# Patient Record
Sex: FEMALE | Employment: PART TIME | URBAN - METROPOLITAN AREA
[De-identification: names, ages, dates, MRNs, and addresses within clinical notes are randomized per-mention and may not be internally consistent; named-entity substitution may affect disease eponyms.]

---

## 2019-02-14 ENCOUNTER — INITIAL CONSULT (OUTPATIENT)
Dept: SURGERY | Age: 39
End: 2019-02-14

## 2019-02-14 VITALS
SYSTOLIC BLOOD PRESSURE: 108 MMHG | HEART RATE: 79 BPM | WEIGHT: 125 LBS | OXYGEN SATURATION: 98 % | HEIGHT: 66 IN | BODY MASS INDEX: 20.09 KG/M2 | DIASTOLIC BLOOD PRESSURE: 68 MMHG

## 2019-02-14 DIAGNOSIS — R23.8 FACIAL AGING: Primary | ICD-10-CM

## 2019-02-14 PROCEDURE — DM00130 PR DERM ONLY BOTOX INJ LEVEL 4: Performed by: PLASTIC SURGERY

## 2019-02-14 PROCEDURE — MISCPX MISCPX: Performed by: PLASTIC SURGERY

## 2019-02-28 ENCOUNTER — OFFICE VISIT (OUTPATIENT)
Dept: SURGERY | Age: 39
End: 2019-02-28

## 2019-02-28 VITALS — BODY MASS INDEX: 20.09 KG/M2 | HEIGHT: 66 IN | WEIGHT: 125 LBS

## 2019-02-28 DIAGNOSIS — R23.8 FACIAL AGING: Primary | ICD-10-CM

## 2019-02-28 PROCEDURE — DM00130 PR DERM ONLY BOTOX INJ LEVEL 4: Performed by: PLASTIC SURGERY

## 2019-03-28 ENCOUNTER — OFFICE VISIT (OUTPATIENT)
Dept: SURGERY | Age: 39
End: 2019-03-28

## 2019-03-28 VITALS
DIASTOLIC BLOOD PRESSURE: 68 MMHG | HEIGHT: 65 IN | RESPIRATION RATE: 20 BRPM | BODY MASS INDEX: 20.83 KG/M2 | WEIGHT: 125 LBS | SYSTOLIC BLOOD PRESSURE: 108 MMHG | TEMPERATURE: 96 F | HEART RATE: 64 BPM

## 2019-03-28 DIAGNOSIS — R23.8 FACIAL AGING: Primary | ICD-10-CM

## 2019-03-28 PROCEDURE — MISCPNC PLASTICS VISIT NO CHARGE: Performed by: PLASTIC SURGERY

## 2019-03-28 RX ORDER — CHLORAL HYDRATE 500 MG
CAPSULE ORAL DAILY
COMMUNITY

## 2019-03-28 RX ORDER — CALCIUM CARBONATE 500(1250)
500 TABLET ORAL DAILY
COMMUNITY

## 2019-03-28 RX ORDER — NICOTINE POLACRILEX 2 MG
GUM BUCCAL
COMMUNITY

## 2019-03-28 NOTE — PROGRESS NOTES
Subjective: Follow up today from  Botox injections to her forehead, and crows feet. Pt responded very well. She is here today for repeat injections. . Denies fever, nausea, vomiting, leg pain or swelling, pain is absent. Objective:    /68   Pulse 64   Temp 96 °F (35.6 °C) (Tympanic)   Resp 20   Ht 5' 5\" (1.651 m)   Wt 125 lb (56.7 kg)   LMP 03/18/2019 (Exact Date)   Breastfeeding? Yes   BMI 20.80 kg/m²     O: Patient has dynamic glabellar frown lines, forehead wrinkles and crows feet       Assessment:    There is no problem list on file for this patient. Plan:     Botox injections to forehead and lateral crows feet. Patient exhibited good response follow up in 2 months. The risks, benefits and options were discussed with the pt. The risks included but not limited to pain, bleeding, infection, heavy scarring, damage to surrounding structures, fluid collections, asymmetry, and need for further procedures. All of Her questions were answered to her satisfaction and She agrees to proceed with the operation. F/U in 2 months. Call office with concerns or signs of infection. I attest that the patient was seen and examined by me, and concur with the documentation above. I agree with the assessment and the plan outlined. This document is generated, in part, by voice recognition software and thus  syntax and grammatical errors are possible.     Ashley Gilmore  12:53 PM  4/4/2019

## 2019-05-08 ENCOUNTER — OFFICE VISIT (OUTPATIENT)
Dept: SURGERY | Age: 39
End: 2019-05-08

## 2019-05-08 VITALS
SYSTOLIC BLOOD PRESSURE: 102 MMHG | HEART RATE: 64 BPM | BODY MASS INDEX: 20.09 KG/M2 | HEIGHT: 66 IN | DIASTOLIC BLOOD PRESSURE: 70 MMHG | WEIGHT: 125 LBS | TEMPERATURE: 98.9 F | OXYGEN SATURATION: 99 %

## 2019-05-08 DIAGNOSIS — R23.8 FACIAL AGING: Primary | ICD-10-CM

## 2019-05-08 PROCEDURE — DM00275 XEOMIN 12: Performed by: PLASTIC SURGERY

## 2019-05-08 NOTE — PROGRESS NOTES
Botulinum Toxin Injection Procedure Note:      The risks, benefits and options were discussed with the pt. The risks included but not limited to pain, bleeding, infection, asymmetry,  and need for further procedures. The patient understands that there is a risk for upper eyelid ptosis. There may be a need for touch up injections and follow up at 2 weeks is encouraged. All of Her questions were answered to Her satisfaction and She agrees to proceed with the operation. The glabella  was cleansed with alcohol. Ice was used to numb the area. The different FDA approved brands of botulinum toxin A were discussed with the patient, Botox was agreed upon and reconstituted in a concentration of 1 unit/ 0.01cc with sterile injectable saline. 30 units were injected into the areas. There was pinpoint bleeding and local redness. The patient tolerated the procedure well. The patient was counseled to use ice for the next hour and limit strenuous activity for 24 hours. Follow up in 2 weeks for check or 3 months for re-injection. I attest that the patient was seen and examined by me, and concur with the documentation above. I agree with the assessment and the plan outlined. This document is generated, in part, by voice recognition software and thus  syntax and grammatical errors are possible.     Talisha Paez  2:10 PM  5/8/2019

## 2019-05-09 NOTE — PROGRESS NOTES
Xeomin lot # [id number] exp 01/21                     Exp: 1sept 2020  FEK:[id number]  Bacteriostatic 0.9% Sodium Chloride lot#  [id number]

## 2019-07-29 NOTE — PROGRESS NOTES
Botulinum Toxin Injection Procedure Note:    The patient reports that she had a fairly optimal result with the Xeomin increased to 30 in the glabella. She has done some reading online and questions about the differences between Xeomin and Botox. I have informed her that molecularly the active molecule is the same however Xeomin has absent surrounding proteins. Also informed her that some patients anecdotally reports that one works better for them than the other. She states she is willing to try Botox today as she would like to compare the 2. The risks, benefits and options were discussed with the pt. The risks included but not limited to pain, bleeding, infection, asymmetry,  and need for further procedures. The patient understands that there is a risk for upper eyelid ptosis. There may be a need for touch up injections and follow up at 2 weeks is encouraged. All of Her questions were answered to Her satisfaction and She agrees to proceed with the operation. The glabella, forehead, crows feet  was cleansed with alcohol. Ice was used to numb the area. The different FDA approved brands of botulinum toxin A were discussed with the patient, Botox was agreed upon and reconstituted in a concentration of 1 unit/ 0.01cc with sterile injectable saline. 56 units were injected into the areas. There was pinpoint bleeding and local redness. The patient tolerated the procedure well. The patient was counseled to use ice for the next hour and limit strenuous activity for 24 hours. Follow up in 2 weeks for check or 3 months for re-injection. I attest that the patient was seen and examined by me, and concur with the documentation above. I agree with the assessment and the plan outlined. This document is generated, in part, by voice recognition software and thus  syntax and grammatical errors are possible.     Lilia Hirsch  9:43 AM  8/2/2019

## 2019-08-01 ENCOUNTER — OFFICE VISIT (OUTPATIENT)
Dept: SURGERY | Age: 39
End: 2019-08-01

## 2019-08-01 VITALS
TEMPERATURE: 97.8 F | HEIGHT: 66 IN | WEIGHT: 125 LBS | DIASTOLIC BLOOD PRESSURE: 60 MMHG | OXYGEN SATURATION: 100 % | BODY MASS INDEX: 20.09 KG/M2 | SYSTOLIC BLOOD PRESSURE: 110 MMHG | HEART RATE: 67 BPM

## 2019-08-01 DIAGNOSIS — R23.8 FACIAL AGING: Primary | ICD-10-CM

## 2019-08-01 PROCEDURE — DM00150 PR OFFICE/OUTPT VISIT,PROCEDURE ONLY: Performed by: PLASTIC SURGERY

## 2019-10-02 NOTE — PROGRESS NOTES
Botox [facility]#[id number] exp 11/2021    Bacteriostatic 0.9% Sodium Chloride  Lot#[id number]            Exp:[id number]  [facility]:[id number]

## 2019-10-10 ENCOUNTER — OFFICE VISIT (OUTPATIENT)
Dept: SURGERY | Age: 39
End: 2019-10-10

## 2019-10-10 DIAGNOSIS — R23.8 FACIAL AGING: Primary | ICD-10-CM

## 2019-10-10 PROCEDURE — DM00150 PR DERM ONLY BOTOX INJ LEVEL 6: Performed by: PLASTIC SURGERY

## 2019-12-05 ENCOUNTER — OFFICE VISIT (OUTPATIENT)
Dept: SURGERY | Age: 39
End: 2019-12-05

## 2019-12-05 VITALS
OXYGEN SATURATION: 98 % | WEIGHT: 127 LBS | BODY MASS INDEX: 20.41 KG/M2 | HEART RATE: 67 BPM | TEMPERATURE: 97 F | HEIGHT: 66 IN | SYSTOLIC BLOOD PRESSURE: 100 MMHG | DIASTOLIC BLOOD PRESSURE: 70 MMHG | RESPIRATION RATE: 16 BRPM

## 2019-12-05 DIAGNOSIS — R23.8 FACIAL AGING: Primary | ICD-10-CM

## 2019-12-05 PROCEDURE — DM00275 XEOMIN 12: Performed by: PLASTIC SURGERY

## 2019-12-05 RX ORDER — UREA 10 %
100 LOTION (ML) TOPICAL DAILY
COMMUNITY

## 2020-02-27 ENCOUNTER — OFFICE VISIT (OUTPATIENT)
Dept: SURGERY | Age: 40
End: 2020-02-27

## 2020-02-27 VITALS
SYSTOLIC BLOOD PRESSURE: 118 MMHG | BODY MASS INDEX: 20.09 KG/M2 | DIASTOLIC BLOOD PRESSURE: 60 MMHG | HEIGHT: 66 IN | OXYGEN SATURATION: 99 % | HEART RATE: 72 BPM | TEMPERATURE: 98.3 F | WEIGHT: 125 LBS

## 2020-02-27 PROCEDURE — DM00275 XEOMIN 12: Performed by: PLASTIC SURGERY

## 2020-02-27 NOTE — PROGRESS NOTES
Xeomin lot # [id number] exp 2022/01    Bacteriostatic 0.9% Sodium Chloride   lot# [id number]   Exp:[id number]   AXJ:[id number]

## 2020-03-02 ENCOUNTER — TELEPHONE (OUTPATIENT)
Dept: SURGERY | Age: 40
End: 2020-03-02

## 2020-03-05 NOTE — PROGRESS NOTES
Botulinum Toxin Injection Procedure Note:    The patient felt that Xeomin tends to work better than Botox and would like to continue with Xeomin. She like to get on schedule with all areas of the face injected today. The risks, benefits and options were discussed with the pt. The risks included but not limited to pain, bleeding, infection, asymmetry,  and need for further procedures. The patient understands that there is a risk for upper eyelid ptosis. There may be a need for touch up injections and follow up at 2 weeks is encouraged. All of Her questions were answered to Her satisfaction and She agrees to proceed with the operation. The glabella, forehead, crows feet  was cleansed with alcohol. Ice was used to numb the area. The different FDA approved brands of botulinum toxin A were discussed with the patient, Xeomin was agreed upon and reconstituted in a concentration of 1 unit/ 0.01cc with sterile injectable saline. 66 units were injected into the areas. There was pinpoint bleeding and local redness. The patient tolerated the procedure well. The patient was counseled to use ice for the next hour and limit strenuous activity for 24 hours. Follow up in 2 weeks for check or 3 months for re-injection. I attest that the patient was seen and examined by me, and concur with the documentation above. I agree with the assessment and the plan outlined. This document is generated, in part, by voice recognition software and thus  syntax and grammatical errors are possible.     [facility]  8:17 AM  3/5/2020

## 2020-05-13 ENCOUNTER — OFFICE VISIT (OUTPATIENT)
Dept: SURGERY | Age: 40
End: 2020-05-13

## 2020-05-13 VITALS — TEMPERATURE: 99.4 F

## 2020-05-13 PROCEDURE — DM00275 XEOMIN 12: Performed by: PLASTIC SURGERY

## 2020-05-18 NOTE — PROGRESS NOTES
Xeomin TDI#[id number] exp 2022/02    Bacteriostatic 0.9% Sodium Chloride   lot#[id number]             Expiration: 01oct2020                 QAW:[id number]

## 2020-07-22 ENCOUNTER — OFFICE VISIT (OUTPATIENT)
Dept: SURGERY | Age: 40
End: 2020-07-22

## 2020-07-22 VITALS
SYSTOLIC BLOOD PRESSURE: 110 MMHG | OXYGEN SATURATION: 96 % | TEMPERATURE: 99 F | WEIGHT: 125 LBS | DIASTOLIC BLOOD PRESSURE: 62 MMHG | HEIGHT: 60 IN | HEART RATE: 70 BPM | BODY MASS INDEX: 24.54 KG/M2

## 2020-07-22 PROCEDURE — DM00275 XEOMIN 12: Performed by: PLASTIC SURGERY

## 2020-07-22 NOTE — PROGRESS NOTES
Xeomin 66 units  NDC 26267-134-24  LOT [id number]  EXP 02/2022    Bacteriostatic 0.9% Sodium Chloride  [id number]  Exp 10/01/20  [facility] [phone]

## 2020-07-22 NOTE — PROGRESS NOTES
Botulinum Toxin Injection Procedure Note:    The patient felt that Xeomin tends to work better than Botox and would like to continue with Xeomin. The risks, benefits and options were discussed with the pt. The risks included but not limited to pain, bleeding, infection, asymmetry,  and need for further procedures. The patient understands that there is a risk for upper eyelid ptosis. There may be a need for touch up injections and follow up at 2 weeks is encouraged. All of Her questions were answered to Her satisfaction and She agrees to proceed with the operation. The glabella, forehead, crows feet  was cleansed with alcohol. Ice was used to numb the area. The different FDA approved brands of botulinum toxin A were discussed with the patient, Xeomin was agreed upon and reconstituted in a concentration of 1 unit/ 0.01cc with sterile injectable saline. 66 units were injected into the areas. There was pinpoint bleeding and local redness. The patient tolerated the procedure well. The patient was counseled to use ice for the next hour and limit strenuous activity for 24 hours. I would like to see her in 5 weeks time to see the optimal effect of her botulinum toxin injection. At that point time we will decide how we will proceed with 3 injections Weather it be the same dose moving forward and understand that she is a fast metabolizer or we may plan on additional injections to the glabella area. This document is generated, in part, by voice recognition software and thus  syntax and grammatical errors are possible.     Vincent Rosales Gooden  11:02 AM  7/22/2020

## 2020-10-22 ENCOUNTER — OFFICE VISIT (OUTPATIENT)
Dept: SURGERY | Age: 40
End: 2020-10-22

## 2020-10-22 VITALS — HEART RATE: 98 BPM | TEMPERATURE: 98.7 F | OXYGEN SATURATION: 98 %

## 2020-10-22 PROCEDURE — DM00275 XEOMIN 12: Performed by: PLASTIC SURGERY

## 2020-10-22 NOTE — PROGRESS NOTES
Botulinum Toxin Injection Procedure Note:    The patient  would like to continue with Xeomin. The risks, benefits and options were discussed with the pt. The risks included but not limited to pain, bleeding, infection, asymmetry,  and need for further procedures. The patient understands that there is a risk for upper eyelid ptosis. There may be a need for touch up injections and follow up at 2 weeks is encouraged. All of Her questions were answered to Her satisfaction and She agrees to proceed with the operation. The glabella, forehead, crows feet  was cleansed with alcohol. Ice was used to numb the area. The different FDA approved brands of botulinum toxin A were discussed with the patient, Xeomin was agreed upon and reconstituted in a concentration of 1 unit/ 0.01cc with sterile injectable saline. 66 units were injected into the areas. There was pinpoint bleeding and local redness. The patient tolerated the procedure well. The patient was counseled to use ice for the next hour and limit strenuous activity for 24 hours. This document is generated, in part, by voice recognition software and thus  syntax and grammatical errors are possible.     Brie Givens  10:33 AM  10/22/2020

## 2020-10-29 NOTE — PROGRESS NOTES
Xeomin lot # [id number] exp 2022/07   Xeomin lot# [id number] exp 2022/02    Bacteriostatic 0.9% Sodium Chloride   lot#   [id number]          Expiration:[id number]                  D:[id number]

## 2021-01-08 ENCOUNTER — OFFICE VISIT (OUTPATIENT)
Dept: SURGERY | Age: 41
End: 2021-01-08

## 2021-01-08 VITALS
OXYGEN SATURATION: 97 % | HEIGHT: 66 IN | BODY MASS INDEX: 20.09 KG/M2 | DIASTOLIC BLOOD PRESSURE: 66 MMHG | SYSTOLIC BLOOD PRESSURE: 102 MMHG | HEART RATE: 92 BPM | TEMPERATURE: 98 F | WEIGHT: 125 LBS

## 2021-01-08 DIAGNOSIS — R23.8 FACIAL AGING: Primary | ICD-10-CM

## 2021-01-08 PROCEDURE — DM00275 PR OFFICE/OUTPT VISIT,PROCEDURE ONLY: Performed by: PLASTIC SURGERY

## 2021-01-08 NOTE — PROGRESS NOTES
Xeomin 66 units  NDC [id number]  LOT # [id number]  EXP 2022-09    Bacteriostatic 0.9% sodium chloride  Lot [id number]  EXP 01 JUL 2021  [facility] [phone]

## 2021-01-08 NOTE — PROGRESS NOTES
Botulinum Toxin Injection Procedure Note:    The patient  would like to continue with Xeomin. The risks, benefits and options were discussed with the pt. The risks included but not limited to pain, bleeding, infection, asymmetry,  and need for further procedures. The patient understands that there is a risk for upper eyelid ptosis. There may be a need for touch up injections and follow up at 2 weeks is encouraged. All of Her questions were answered to Her satisfaction and She agrees to proceed with the operation. The glabella, forehead, crows feet  was cleansed with alcohol. Ice was used to numb the area. The different FDA approved brands of botulinum toxin A were discussed with the patient, Xeomin was agreed upon and reconstituted in a concentration of 1 unit/ 0.01cc with sterile injectable saline. 66 units were injected into the areas. There was pinpoint bleeding and local redness. The patient tolerated the procedure well. The patient was counseled to use ice for the next hour and limit strenuous activity for 24 hours. This document is generated, in part, by voice recognition software and thus  syntax and grammatical errors are possible.     Claude Solares  10:27 AM  1/8/2021

## 2021-02-15 NOTE — PROGRESS NOTES
Botulinum Toxin Injection Procedure Note:    The patient  would like to continue with Xeomin. She presents today for an additional injection to the glabella following her previous Xeomin injections. She states she still notes motion to the glabella and would like additional units at this time. I discussed with her today adding these additional units for future injections every 3 months. The risks, benefits and options were discussed with the pt. The risks included but not limited to pain, bleeding, infection, asymmetry,  and need for further procedures. The patient understands that there is a risk for upper eyelid ptosis. There may be a need for touch up injections and follow up at 2 weeks is encouraged. All of Her questions were answered to Her satisfaction and She agrees to proceed with the operation. The glabella,  was cleansed with alcohol. Ice was used to numb the area. The different FDA approved brands of botulinum toxin A were discussed with the patient, Xeomin was agreed upon and reconstituted in a concentration of 1 unit/ 0.01cc with sterile injectable saline. 5 units were injected into the areas. There was pinpoint bleeding and local redness. The patient tolerated the procedure well. The patient was counseled to use ice for the next hour and limit strenuous activity for 24 hours. This document is generated, in part, by voice recognition software and thus  syntax and grammatical errors are possible.     Abigail Hernandez  10:38 AM  3/2/2021

## 2021-02-19 ENCOUNTER — OFFICE VISIT (OUTPATIENT)
Dept: SURGERY | Age: 41
End: 2021-02-19

## 2021-02-19 VITALS
SYSTOLIC BLOOD PRESSURE: 98 MMHG | HEIGHT: 66 IN | HEART RATE: 76 BPM | BODY MASS INDEX: 20.48 KG/M2 | DIASTOLIC BLOOD PRESSURE: 68 MMHG | TEMPERATURE: 98.6 F | OXYGEN SATURATION: 98 %

## 2021-02-19 DIAGNOSIS — R23.8 FACIAL AGING: Primary | ICD-10-CM

## 2021-02-19 PROCEDURE — DM00275 XEOMIN 12: Performed by: PLASTIC SURGERY

## 2021-02-19 NOTE — PROGRESS NOTES
Xeomin 5 units  NDC [id number]  LOT [id number]  EXP 10/22    Bacteriostatic 0.9% sodium  Chloride  LOT [id number]  EXP 01 JUL 2021  [facility] [phone]

## 2021-04-02 ENCOUNTER — OFFICE VISIT (OUTPATIENT)
Dept: SURGERY | Age: 41
End: 2021-04-02

## 2021-04-02 VITALS
TEMPERATURE: 98.7 F | WEIGHT: 125 LBS | HEIGHT: 66 IN | BODY MASS INDEX: 20.09 KG/M2 | OXYGEN SATURATION: 100 % | SYSTOLIC BLOOD PRESSURE: 102 MMHG | DIASTOLIC BLOOD PRESSURE: 70 MMHG | HEART RATE: 75 BPM

## 2021-04-02 DIAGNOSIS — R23.8 FACIAL AGING: Primary | ICD-10-CM

## 2021-04-02 PROCEDURE — DM00275 XEOMIN 12: Performed by: PLASTIC SURGERY

## 2021-04-02 NOTE — PROGRESS NOTES
Botulinum Toxin Injection Procedure Note:    The patient  would like to continue with Xeomin. The risks, benefits and options were discussed with the pt. The risks included but not limited to pain, bleeding, infection, asymmetry,  and need for further procedures. The patient understands that there is a risk for upper eyelid ptosis. There may be a need for touch up injections and follow up at 2 weeks is encouraged. All of Her questions were answered to Her satisfaction and She agrees to proceed with the operation. The glabella, forehead, crows feet  was cleansed with alcohol. Ice was used to numb the area. The different FDA approved brands of botulinum toxin A were discussed with the patient, Xeomin was agreed upon and reconstituted in a concentration of 1 unit/ 0.01cc with sterile injectable saline. 71 units were injected into the areas. There was pinpoint bleeding and local redness. The patient tolerated the procedure well. The patient was counseled to use ice for the next hour and limit strenuous activity for 24 hours.       Lesia Rick

## 2021-04-02 NOTE — PROGRESS NOTES
04/02/21 xeomin 71 units  NDC 80967-768-23  Lot # [id number]  EXP 12/22    Bacteriostatic 0.9% Sodium Chloride  Lot [id number]  EXP 01 JUL 2021  [facility] 47 [id number]

## 2021-06-18 ENCOUNTER — OFFICE VISIT (OUTPATIENT)
Dept: SURGERY | Age: 41
End: 2021-06-18

## 2021-06-18 VITALS — SYSTOLIC BLOOD PRESSURE: 100 MMHG | DIASTOLIC BLOOD PRESSURE: 70 MMHG

## 2021-06-18 DIAGNOSIS — R23.8 FACIAL AGING: Primary | ICD-10-CM

## 2021-06-18 PROCEDURE — DM00150 PR DERM ONLY BOTOX INJ LEVEL 6: Performed by: PLASTIC SURGERY

## 2021-09-02 NOTE — PROGRESS NOTES
Botulinum Toxin Injection Procedure Note:    The patient  would like to continue with Xeomin. The risks, benefits and options were discussed with the pt. The risks included but not limited to pain, bleeding, infection, asymmetry,  and need for further procedures. The patient understands that there is a risk for upper eyelid ptosis. There may be a need for touch up injections and follow up at 2 weeks is encouraged. All of Her questions were answered to Her satisfaction and She agrees to proceed with the operation. The glabella, forehead, crows feet  was cleansed with alcohol. Ice was used to numb the area. The different FDA approved brands of botulinum toxin A were discussed with the patient, Xeomin was agreed upon and reconstituted in a concentration of 1 unit/ 0.01cc with sterile injectable saline. 76 units were injected into the areas. There was pinpoint bleeding and local redness. The patient tolerated the procedure well. The patient was counseled to use ice for the next hour and limit strenuous activity for 24 hours. This document is generated, in part, by voice recognition software and thus  syntax and grammatical errors are possible.     Umang Liu MD  10:45 AM  9/3/2021

## 2021-09-03 ENCOUNTER — OFFICE VISIT (OUTPATIENT)
Dept: SURGERY | Age: 41
End: 2021-09-03

## 2021-09-03 VITALS — TEMPERATURE: 97.1 F | WEIGHT: 125 LBS | BODY MASS INDEX: 20.09 KG/M2 | HEIGHT: 66 IN

## 2021-09-03 DIAGNOSIS — R23.8 FACIAL AGING: Primary | ICD-10-CM

## 2021-09-03 PROCEDURE — DM00275 XEOMIN 12: Performed by: PLASTIC SURGERY

## 2021-09-03 NOTE — PROGRESS NOTES
Xeomin- 76 units  LOT [id number]  NDC 33333-752.66  EXP 07/2023    Bacteriostatic 0.9% sodium chloride  LOT [id number]  EXP 01 MAY 2022  [facility] 47 [phone]

## 2021-10-15 ENCOUNTER — OFFICE VISIT (OUTPATIENT)
Dept: SURGERY | Age: 41
End: 2021-10-15

## 2021-10-15 VITALS — OXYGEN SATURATION: 99 % | HEART RATE: 82 BPM | TEMPERATURE: 97.3 F

## 2021-10-15 DIAGNOSIS — R23.8 FACIAL AGING: Primary | ICD-10-CM

## 2021-10-15 PROCEDURE — DM00275 XEOMIN 12: Performed by: PLASTIC SURGERY

## 2021-10-15 NOTE — PROGRESS NOTES
Xeomin 10 units  LOT [id number]  Exp 03/2023    Bacteriostatic 0.9% sodium chloride  LOT [id number]  EXP 01 May 2022  [facility] 47 [phone]

## 2021-10-15 NOTE — PROGRESS NOTES
Botulinum Toxin Injection Procedure Note:    The patient  would like to continue with Xeomin. The risks, benefits and options were discussed with the pt. The risks included but not limited to pain, bleeding, infection, asymmetry,  and need for further procedures. The patient understands that there is a risk for upper eyelid ptosis. There may be a need for touch up injections and follow up at 2 weeks is encouraged. All of Her questions were answered to Her satisfaction and She agrees to proceed with the operation. The glabella was cleansed with alcohol. Ice was used to numb the area. The different FDA approved brands of botulinum toxin A were discussed with the patient, Xeomin was agreed upon and reconstituted in a concentration of 1 unit/ 0.01cc with sterile injectable saline. 10 units were injected into the areas. There was pinpoint bleeding and local redness. The patient tolerated the procedure well. The patient was counseled to use ice for the next hour and limit strenuous activity for 24 hours. This document is generated, in part, by voice recognition software and thus  syntax and grammatical errors are possible.     Re Melo MD  10:17 AM  10/15/2021

## 2021-10-18 NOTE — PROGRESS NOTES
Xeomin [facility]#[id number] exp 3/2023    Bacteriostatic 0.9% Sodium Chloride   lot#  [id number]           Expiration:  [id number]         EF

## 2021-12-16 ENCOUNTER — OFFICE VISIT (OUTPATIENT)
Dept: SURGERY | Age: 41
End: 2021-12-16

## 2021-12-16 VITALS — TEMPERATURE: 97 F

## 2021-12-16 DIAGNOSIS — R23.8 FACIAL AGING: Primary | ICD-10-CM

## 2021-12-16 PROCEDURE — DM00275 XEOMIN 12: Performed by: PLASTIC SURGERY

## 2021-12-16 NOTE — PROGRESS NOTES
Botulinum Toxin Injection Procedure Note:    The patient  would like to continue with Xeomin. She did notice a significant difference in the extra 10 units she received in the glabella and during the midportion of her 3 months. She would like to add this additional 10 units to her injection pattern today. The risks, benefits and options were discussed with the pt. The risks included but not limited to pain, bleeding, infection, asymmetry,  and need for further procedures. The patient understands that there is a risk for upper eyelid ptosis. There may be a need for touch up injections and follow up at 2 weeks is encouraged. All of Her questions were answered to Her satisfaction and She agrees to proceed with the operation. The glabella was cleansed with alcohol. Ice was used to numb the area. The different FDA approved brands of botulinum toxin A were discussed with the patient, Xeomin was agreed upon and reconstituted in a concentration of 1 unit/ 0.01cc with sterile injectable saline. 86 units were injected into the areas. There was pinpoint bleeding and local redness. The patient tolerated the procedure well. The patient was counseled to use ice for the next hour and limit strenuous activity for 24 hours. This document is generated, in part, by voice recognition software and thus  syntax and grammatical errors are possible.     Sylvia Marshall MD  10:12 AM  12/16/2021

## 2022-02-24 ENCOUNTER — OFFICE VISIT (OUTPATIENT)
Dept: SURGERY | Age: 42
End: 2022-02-24

## 2022-02-24 VITALS — TEMPERATURE: 98.1 F

## 2022-02-24 DIAGNOSIS — R23.8 FACIAL AGING: Primary | ICD-10-CM

## 2022-02-24 DIAGNOSIS — I83.93 SPIDER VEINS OF BOTH LOWER EXTREMITIES: ICD-10-CM

## 2022-02-24 PROCEDURE — MISCIPL10: Performed by: PLASTIC SURGERY

## 2022-02-24 PROCEDURE — DM00275 XEOMIN 12: Performed by: PLASTIC SURGERY

## 2022-02-24 NOTE — PROGRESS NOTES
Xeomin 86 units  LOT [id number]  NDC 5965-1874-82    Bacteriostatic 0.9% sodium chloride  LOT [id number]  EXP 01 AUG 2023  . [place] [address] [phone]

## 2022-02-24 NOTE — PROGRESS NOTES
Botulinum Toxin Injection Procedure Note:    The patient  would like to continue with Xeomin. She did notice a significant difference in the extra 10 units she received in the glabella and during the midportion of her 3 months. She would like to add this additional 10 units to her injection pattern today. The risks, benefits and options were discussed with the pt. The risks included but not limited to pain, bleeding, infection, asymmetry,  and need for further procedures. The patient understands that there is a risk for upper eyelid ptosis. There may be a need for touch up injections and follow up at 2 weeks is encouraged. All of Her questions were answered to Her satisfaction and She agrees to proceed with the operation. The glabella was cleansed with alcohol. Ice was used to numb the area. The different FDA approved brands of botulinum toxin A were discussed with the patient, Xeomin was agreed upon and reconstituted in a concentration of 1 unit/ 0.01cc with sterile injectable saline. 78 units were injected into the areas. There was pinpoint bleeding and local redness. The patient tolerated the procedure well. The patient was counseled to use ice for the next hour and limit strenuous activity for 24 hours. Patient received a BBL treatment today for bilateral leg spider veins, performed by Dr. Sondra Dunlap. Laser safety protocol were followed. The procedure was carried out and the patient tolerated this well. Please see scanned procedure note for laser settings. Patient was sent home with moisturizer placed over the treatment area. There was no skin breakdown at the end of the procedure. Risks of laser therapy were explained including bleeding, scarring, hyopigmentation, hyperpigmentation that can be worsened by sun exposure. The patient is educated to utilize sun protection for 3-4 months after the procedure, understands that there will be some pain involved during the procedure. Follow Up 3-7 days. This document is generated, in part, by voice recognition software and thus  syntax and grammatical errors are possible.     Sri Bingham MD  9:58 AM  3/3/2022

## 2022-05-12 NOTE — PROGRESS NOTES
Botulinum Toxin Injection Procedure Note:    The patient  would like to continue with Xeomin. The risks, benefits and options were discussed with the pt. The risks included but not limited to pain, bleeding, infection, asymmetry,  and need for further procedures. The patient understands that there is a risk for upper eyelid ptosis. There may be a need for touch up injections and follow up at 2 weeks is encouraged. All of Her questions were answered to Her satisfaction and She agrees to proceed with the operation. The glabella was cleansed with alcohol. Ice was used to numb the area. The different FDA approved brands of botulinum toxin A were discussed with the patient, Xeomin was agreed upon and reconstituted in a concentration of 1 unit/ 0.01cc with sterile injectable saline. 78 units were injected into the areas. There was pinpoint bleeding and local redness. The patient tolerated the procedure well. The patient was counseled to use ice for the next hour and limit strenuous activity for 24 hours. Patient received a BBL treatment today for bilateral leg spider veins, performed by Dr. Celi Boland. Laser safety protocol were followed. The procedure was carried out and the patient tolerated this well. Please see scanned procedure note for laser settings. Patient was sent home with moisturizer placed over the treatment area. There was no skin breakdown at the end of the procedure. Risks of laser therapy were explained including bleeding, scarring, hyopigmentation, hyperpigmentation that can be worsened by sun exposure. The patient is educated to utilize sun protection for 3-4 months after the procedure, understands that there will be some pain involved during the procedure. Follow Up 3-7 days. This document is generated, in part, by voice recognition software and thus  syntax and grammatical errors are possible.     Audrey Domínguez  10:55 AM  5/12/2022

## 2022-05-13 ENCOUNTER — OFFICE VISIT (OUTPATIENT)
Dept: SURGERY | Age: 42
End: 2022-05-13

## 2022-05-13 VITALS — TEMPERATURE: 98.4 F | OXYGEN SATURATION: 98 % | HEART RATE: 69 BPM

## 2022-05-13 DIAGNOSIS — R23.8 FACIAL AGING: Primary | ICD-10-CM

## 2022-05-13 PROCEDURE — DM00275 PR OFFICE/OUTPT VISIT,PROCEDURE ONLY: Performed by: PLASTIC SURGERY

## 2022-05-13 NOTE — PROGRESS NOTES
Xeomin 86 units  LOT [id number]  EXP 02/2024    Bacteriostatic 0.9% sodium chloride  LOT [id number]  EXP 01 Aug 2023  [facility] [id number]

## 2022-07-21 NOTE — PROGRESS NOTES
Botulinum Toxin Injection Procedure Note:    The patient  would like to continue with Xeomin. The risks, benefits and options were discussed with the pt. The risks included but not limited to pain, bleeding, infection, asymmetry,  and need for further procedures. The patient understands that there is a risk for upper eyelid ptosis. There may be a need for touch up injections and follow up at 2 weeks is encouraged. All of Her questions were answered to Her satisfaction and She agrees to proceed with the operation. The glabella was cleansed with alcohol. Ice was used to numb the area. The different FDA approved brands of botulinum toxin A were discussed with the patient, Xeomin was agreed upon and reconstituted in a concentration of 1 unit/ 0.01cc with sterile injectable saline. 78 units were injected into the areas. (2 units of this are physician samples )there was pinpoint bleeding and local redness. The patient tolerated the procedure well. The patient was counseled to use ice for the next hour and limit strenuous activity for 24 hours. Patient received a BBL treatment today for bilateral leg spider veins, performed by Dr. Eva Fournier. Laser safety protocol were followed. The procedure was carried out and the patient tolerated this well. Please see scanned procedure note for laser settings. Patient was sent home with moisturizer placed over the treatment area. There was no skin breakdown at the end of the procedure. Risks of laser therapy were explained including bleeding, scarring, hyopigmentation, hyperpigmentation that can be worsened by sun exposure. The patient is educated to utilize sun protection for 3-4 months after the procedure, understands that there will be some pain involved during the procedure. Follow Up 3-7 days. This document is generated, in part, by voice recognition software and thus  syntax and grammatical errors are possible.     Bassem Brewer MD  10:19 AM  7/26/2022

## 2022-07-22 ENCOUNTER — OFFICE VISIT (OUTPATIENT)
Dept: SURGERY | Age: 42
End: 2022-07-22

## 2022-07-22 VITALS
HEIGHT: 65 IN | TEMPERATURE: 97.7 F | BODY MASS INDEX: 20.83 KG/M2 | OXYGEN SATURATION: 98 % | HEART RATE: 74 BPM | WEIGHT: 125 LBS

## 2022-07-22 DIAGNOSIS — R23.8 FACIAL AGING: Primary | ICD-10-CM

## 2022-07-22 PROCEDURE — DM00275 XEOMIN 12: Performed by: PLASTIC SURGERY

## 2022-10-11 NOTE — PROGRESS NOTES
Botulinum Toxin Injection Procedure Note:    The patient  would like to continue with Xeomin. The risks, benefits and options were discussed with the pt. The risks included but not limited to pain, bleeding, infection, asymmetry,  and need for further procedures. The patient understands that there is a risk for upper eyelid ptosis. There may be a need for touch up injections and follow up at 2 weeks is encouraged. All of Her questions were answered to Her satisfaction and She agrees to proceed with the operation. The glabella was cleansed with alcohol. Ice was used to numb the area. The different FDA approved brands of botulinum toxin A were discussed with the patient, Xeomin was agreed upon and reconstituted in a concentration of 1 unit/ 0.01cc with sterile injectable saline. 78 units were injected into the areas. (2 units of this are physician samples )there was pinpoint bleeding and local redness. The patient tolerated the procedure well. The patient was counseled to use ice for the next hour and limit strenuous activity for 24 hours. Patient received a BBL treatment today for bilateral leg spider veins, performed by Dr. Alexa Koehler. Laser safety protocol were followed. The procedure was carried out and the patient tolerated this well. Please see scanned procedure note for laser settings. Patient was sent home with moisturizer placed over the treatment area. There was no skin breakdown at the end of the procedure. Risks of laser therapy were explained including bleeding, scarring, hyopigmentation, hyperpigmentation that can be worsened by sun exposure. The patient is educated to utilize sun protection for 3-4 months after the procedure, understands that there will be some pain involved during the procedure. Follow Up 3-7 days.       Sean Blevins MD

## 2022-10-12 ENCOUNTER — OFFICE VISIT (OUTPATIENT)
Dept: SURGERY | Age: 42
End: 2022-10-12

## 2022-10-12 VITALS — OXYGEN SATURATION: 98 % | HEART RATE: 75 BPM | TEMPERATURE: 98.4 F

## 2022-10-12 DIAGNOSIS — R23.8 FACIAL AGING: Primary | ICD-10-CM

## 2022-10-12 PROCEDURE — DM00275 XEOMIN 12: Performed by: PLASTIC SURGERY

## 2023-01-11 NOTE — PROGRESS NOTES
Botulinum Toxin Injection Procedure Note:    The patient  would like to continue with Xeomin. The risks, benefits and options were discussed with the pt. The risks included but not limited to pain, bleeding, infection, asymmetry,  and need for further procedures. The patient understands that there is a risk for upper eyelid ptosis. There may be a need for touch up injections and follow up at 2 weeks is encouraged. All of Her questions were answered to Her satisfaction and She agrees to proceed with the operation. The glabella was cleansed with alcohol. Ice was used to numb the area. The different FDA approved brands of botulinum toxin A were discussed with the patient, Xeomin was agreed upon and reconstituted in a concentration of 1 unit/ 0.01cc with sterile injectable saline. 86units were injected into the areas. (2 units of this are physician samples )there was pinpoint bleeding and local redness. The patient tolerated the procedure well. The patient was counseled to use ice for the next hour and limit strenuous activity for 24 hours. Patient received a BBL treatment today for bilateral leg spider veins, performed by Dr. Sahara Vazquez. Laser safety protocol were followed. The procedure was carried out and the patient tolerated this well. Please see scanned procedure note for laser settings. Patient was sent home with moisturizer placed over the treatment area. There was no skin breakdown at the end of the procedure. Risks of laser therapy were explained including bleeding, scarring, hyopigmentation, hyperpigmentation that can be worsened by sun exposure. The patient is educated to utilize sun protection for 3-4 months after the procedure, understands that there will be some pain involved during the procedure. Follow Up 3-7 days. This document is generated, in part, by voice recognition software and thus  syntax and grammatical errors are possible.     Felicia Trinidad MD  10:28 AM  1/12/2023

## 2023-01-12 ENCOUNTER — OFFICE VISIT (OUTPATIENT)
Dept: SURGERY | Age: 43
End: 2023-01-12

## 2023-01-12 VITALS — TEMPERATURE: 99.4 F

## 2023-01-12 DIAGNOSIS — R23.8 FACIAL AGING: Primary | ICD-10-CM

## 2023-01-12 PROCEDURE — DM00275 XEOMIN 12: Performed by: PLASTIC SURGERY

## 2023-01-12 NOTE — PROGRESS NOTES
Xeomin 86 units  NDC 58235-684-18  LOT [id number]  EXP 09/2024    Bacteriostatic 0.9% sodium chloride  LOT -[id number]  EXP 02/01/2024  Rika Anderson 48 [id number]

## 2023-01-30 NOTE — PROGRESS NOTES
Patient received a BBL treatment today for his patchy spider veins less than 1 mm of both legs, performed by Dr. Manjeet Trinidad. Laser safety protocol were followed. The procedure was carried out and the patient tolerated this well. Please see scanned procedure note for laser settings. Patient was sent home with moisturizer placed over the treatment area. There was no skin breakdown at the end of the procedure. Educated patient about compression stocking wearing for 1 week    Risks of laser therapy were explained including bleeding, scarring, hyopigmentation, hyperpigmentation that can be worsened by sun exposure. The patient is educated to utilize sun protection for 3-4 months after the procedure, understands that there will be some pain involved during the procedure. Follow Up 3-7 days. This document is generated, in part, by voice recognition software and thus  syntax and grammatical errors are possible.     Selma Nolasco MD  10:04 AM  2/3/2023

## 2023-02-03 ENCOUNTER — OFFICE VISIT (OUTPATIENT)
Dept: SURGERY | Age: 43
End: 2023-02-03

## 2023-02-03 DIAGNOSIS — I83.93 SPIDER VEINS OF BOTH LOWER EXTREMITIES: Primary | ICD-10-CM

## 2023-02-03 PROCEDURE — 99999 PR OFFICE/OUTPT VISIT,PROCEDURE ONLY: CPT | Performed by: PLASTIC SURGERY

## 2023-03-24 ENCOUNTER — OFFICE VISIT (OUTPATIENT)
Dept: SURGERY | Age: 43
End: 2023-03-24

## 2023-03-24 DIAGNOSIS — R23.8 FACIAL AGING: Primary | ICD-10-CM

## 2023-03-24 NOTE — PROGRESS NOTES
Botulinum Toxin Injection Procedure Note:    The patient  would like to continue with Xeomin. Patient has seen some improvement of her spider veins since her last BBL treatment a month ago. Patient like to try 1 more time before escalating to sclerotherapy. The risks, benefits and options were discussed with the pt. The risks included but not limited to pain, bleeding, infection, asymmetry,  and need for further procedures. The patient understands that there is a risk for upper eyelid ptosis. There may be a need for touch up injections and follow up at 2 weeks is encouraged. All of Her questions were answered to Her satisfaction and She agrees to proceed with the operation. The glabella was cleansed with alcohol. Ice was used to numb the area. The different FDA approved brands of botulinum toxin A were discussed with the patient, Xeomin was agreed upon and reconstituted in a concentration of 1 unit/ 0.01cc with sterile injectable saline. 86units were injected into the areas. (2 units of this are physician samples )there was pinpoint bleeding and local redness. The patient tolerated the procedure well. This document is generated, in part, by voice recognition software and thus  syntax and grammatical errors are possible.     Darryl Linn MD  9:01 AM  3/24/2023

## 2023-06-16 ENCOUNTER — OFFICE VISIT (OUTPATIENT)
Dept: SURGERY | Age: 43
End: 2023-06-16

## 2023-06-16 DIAGNOSIS — R23.8 FACIAL AGING: Primary | ICD-10-CM

## 2023-06-16 PROCEDURE — MISCPNC PLASTICS VISIT NO CHARGE: Performed by: PLASTIC SURGERY

## 2023-06-16 PROCEDURE — DM00275 XEOMIN 12: Performed by: PLASTIC SURGERY

## 2023-07-20 ENCOUNTER — HOSPITAL ENCOUNTER (OUTPATIENT)
Dept: DATA CONVERSION | Facility: HOSPITAL | Age: 43
End: 2023-07-20
Attending: RADIOLOGY | Admitting: RADIOLOGY

## 2023-07-20 DIAGNOSIS — N60.01 SOLITARY CYST OF RIGHT BREAST: ICD-10-CM

## 2023-07-20 DIAGNOSIS — N63.10 UNSPECIFIED LUMP IN THE RIGHT BREAST, UNSPECIFIED QUADRANT: ICD-10-CM

## 2023-07-26 LAB
COMPLETE PATHOLOGY REPORT: NORMAL
CONVERTED CLINICAL DIAGNOSIS-HISTORY: NORMAL
CONVERTED FINAL DIAGNOSIS: NORMAL
CONVERTED FINAL REPORT PDF LINK TO COPY AND PASTE: NORMAL
CONVERTED GROSS DESCRIPTION: NORMAL

## 2023-09-01 ENCOUNTER — OFFICE VISIT (OUTPATIENT)
Dept: SURGERY | Age: 43
End: 2023-09-01

## 2023-09-01 VITALS — TEMPERATURE: 99.1 F

## 2023-09-01 DIAGNOSIS — R23.8 FACIAL AGING: Primary | ICD-10-CM

## 2023-09-01 NOTE — PROGRESS NOTES
Botulinum Toxin Injection Procedure Note:    The patient  would like to continue with Xeomin. The risks, benefits and options were discussed with the pt. The risks included but not limited to pain, bleeding, infection, asymmetry,  and need for further procedures. The patient understands that there is a risk for upper eyelid ptosis. There may be a need for touch up injections and follow up at 2 weeks is encouraged. All of Her questions were answered to Her satisfaction and She agrees to proceed with the operation. The glabella was cleansed with alcohol. Ice was used to numb the area. The different FDA approved brands of botulinum toxin A were discussed with the patient, Xeomin was agreed upon and reconstituted in a concentration of 1 unit/ 0.01cc with sterile injectable saline. 86units were injected into the areas. (2 units of this are physician samples )there was pinpoint bleeding and local redness. The patient tolerated the procedure well. The patient was counseled to use ice for the next hour and limit strenuous activity for 24 hours. This document is generated, in part, by voice recognition software and thus  syntax and grammatical errors are possible.     Sandy Ca MD  10:36 AM  9/1/2023

## 2023-12-14 NOTE — PROGRESS NOTES
Botulinum Toxin Injection Procedure Note:    The patient  would like to continue with Xeomin. The risks, benefits and options were discussed with the pt. The risks included but not limited to pain, bleeding, infection, asymmetry,  and need for further procedures. The patient understands that there is a risk for upper eyelid ptosis. There may be a need for touch up injections and follow up at 2 weeks is encouraged. All of Her questions were answered to Her satisfaction and She agrees to proceed with the operation. The glabella was cleansed with alcohol. Ice was used to numb the area. The different FDA approved brands of botulinum toxin A were discussed with the patient, Xeomin was agreed upon and reconstituted in a concentration of 1 unit/ 0.01cc with sterile injectable saline. 86units were injected into the areas. (2 units of this are physician samples )there was pinpoint bleeding and local redness. The patient tolerated the procedure well. The patient was counseled to use ice for the next hour and limit strenuous activity for 24 hours. This document is generated, in part, by voice recognition software and thus  syntax and grammatical errors are possible.     Luis Birch MD  9:09 AM  12/15/2023

## 2023-12-15 ENCOUNTER — OFFICE VISIT (OUTPATIENT)
Dept: SURGERY | Age: 43
End: 2023-12-15

## 2023-12-15 VITALS — TEMPERATURE: 98.2 F

## 2023-12-15 DIAGNOSIS — R23.8 FACIAL AGING: Primary | ICD-10-CM

## 2024-01-29 ENCOUNTER — OFFICE VISIT (OUTPATIENT)
Dept: SURGERY | Facility: CLINIC | Age: 44
End: 2024-01-29

## 2024-01-29 VITALS
BODY MASS INDEX: 20.41 KG/M2 | HEART RATE: 68 BPM | TEMPERATURE: 98.2 F | DIASTOLIC BLOOD PRESSURE: 58 MMHG | OXYGEN SATURATION: 100 % | WEIGHT: 127 LBS | SYSTOLIC BLOOD PRESSURE: 90 MMHG | HEIGHT: 66 IN

## 2024-01-29 DIAGNOSIS — R92.8 ABNORMAL MAMMOGRAM: ICD-10-CM

## 2024-01-29 DIAGNOSIS — Z80.41 FAMILY HISTORY OF OVARIAN CANCER: Primary | ICD-10-CM

## 2024-01-29 DIAGNOSIS — Z80.0 FAMILY HISTORY OF RECTAL CANCER: ICD-10-CM

## 2024-01-29 PROCEDURE — 99203 OFFICE O/P NEW LOW 30 MIN: CPT | Performed by: SURGERY

## 2024-01-29 PROCEDURE — 1036F TOBACCO NON-USER: CPT | Performed by: SURGERY

## 2024-01-29 ASSESSMENT — PATIENT HEALTH QUESTIONNAIRE - PHQ9
1. LITTLE INTEREST OR PLEASURE IN DOING THINGS: NOT AT ALL
2. FEELING DOWN, DEPRESSED OR HOPELESS: NOT AT ALL
SUM OF ALL RESPONSES TO PHQ9 QUESTIONS 1 AND 2: 0

## 2024-01-29 ASSESSMENT — ENCOUNTER SYMPTOMS
LOSS OF SENSATION IN FEET: 0
DEPRESSION: 0
OCCASIONAL FEELINGS OF UNSTEADINESS: 0

## 2024-01-29 ASSESSMENT — PAIN SCALES - GENERAL: PAINLEVEL: 0-NO PAIN

## 2024-01-31 NOTE — PROGRESS NOTES
Subjective   Patient ID: Alice Mcgovern is a 43 y.o. female who presents for New Patient Visit (NPV Abnormal Pascual, wanting second opinion).  HPI  Pt is now 43, recently had a mamm and was concernied about ultrasound findings.  Has a family hx of ovarian cancer. Also concerned about a palpable cyst in right breast  Review of Systems  10 point review is otherwise neg  No tobacco  No etoh  Objective   Physical ExamHEENT NR  Lungs clear  Heart RRR  Breasts symmetric, no skin changes, niple discharge or retraction  Small molile subareolar mass on the right corresponding to area that was biopsied rest of exam very dense fibroglandular tissue on both sides  Mamm and US reviewed along with bx results  All benign findings    Assessment/Plan rec continued yearly mamm  Consider breast MRI  Refer to genetics  If mri not covered will order fast MRI           Mari Butts MD 01/31/24 1:39 PM

## 2024-03-18 NOTE — PROGRESS NOTES
Botulinum Toxin Injection Procedure Note:    The patient  would like to continue with Xeomin.        The risks, benefits and options were discussed with the pt. The risks included but not limited to pain, bleeding, infection, asymmetry,  and need for further procedures. The patient understands that there is a risk for upper eyelid ptosis. There may be a need for touch up injections and follow up at 2 weeks is encouraged. All of Her questions were answered to Her satisfaction and She agrees to proceed with the operation.    The glabella was cleansed with alcohol. Ice was used to numb the area. The different FDA approved brands of botulinum toxin A were discussed with the patient, Xeomin was agreed upon and reconstituted in a concentration of 1 unit/ 0.01cc with sterile injectable saline.  86units were injected into the areas. There was pinpoint bleeding and local redness. The patient tolerated the procedure well.    The patient was counseled to use ice for the next hour and limit strenuous activity for 24 hours.    The patient declined BBL to her legs today she will discuss with our physician assistant on possible sclerotherapy in the future    Sadi Gooden MD

## 2024-03-22 ENCOUNTER — OFFICE VISIT (OUTPATIENT)
Dept: SURGERY | Age: 44
End: 2024-03-22

## 2024-03-22 VITALS — TEMPERATURE: 97.8 F

## 2024-03-22 DIAGNOSIS — R23.8 FACIAL AGING: Primary | ICD-10-CM

## 2024-06-21 ENCOUNTER — OFFICE VISIT (OUTPATIENT)
Dept: SURGERY | Age: 44
End: 2024-06-21

## 2024-06-21 VITALS — TEMPERATURE: 98.1 F

## 2024-06-21 DIAGNOSIS — R23.8 FACIAL AGING: Primary | ICD-10-CM

## 2024-06-21 NOTE — PROGRESS NOTES
Botulinum Toxin Injection Procedure Note:    The patient  would like to continue with Xeomin.        The risks, benefits and options were discussed with the pt. The risks included but not limited to pain, bleeding, infection, asymmetry,  and need for further procedures. The patient understands that there is a risk for upper eyelid ptosis. There may be a need for touch up injections and follow up at 2 weeks is encouraged. All of Her questions were answered to Her satisfaction and She agrees to proceed with the operation.    The glabella was cleansed with alcohol. Ice was used to numb the area. The different FDA approved brands of botulinum toxin A were discussed with the patient, Xeomin was agreed upon and reconstituted in a concentration of 1 unit/ 0.01cc with sterile injectable saline.  86units were injected into the areas. There was pinpoint bleeding and local redness. The patient tolerated the procedure well.    The patient was counseled to use ice for the next hour and limit strenuous activity for 24 hours.    This document is generated, in part, by voice recognition software and thus  syntax and grammatical errors are possible.    Sadi Gooden MD  9:17 AM  6/21/2024

## 2024-09-13 ENCOUNTER — OFFICE VISIT (OUTPATIENT)
Dept: SURGERY | Age: 44
End: 2024-09-13

## 2024-09-13 VITALS — TEMPERATURE: 97.9 F

## 2024-09-13 DIAGNOSIS — R23.8 FACIAL AGING: Primary | ICD-10-CM

## 2024-09-13 PROCEDURE — DM00275 XEOMIN 12: Performed by: PLASTIC SURGERY

## 2024-09-26 ENCOUNTER — INITIAL CONSULT (OUTPATIENT)
Dept: SURGERY | Age: 44
End: 2024-09-26

## 2024-09-26 VITALS — SYSTOLIC BLOOD PRESSURE: 100 MMHG | TEMPERATURE: 98.1 F | DIASTOLIC BLOOD PRESSURE: 66 MMHG

## 2024-09-26 DIAGNOSIS — I78.1 TELANGIECTASIA: Primary | ICD-10-CM

## 2024-10-08 ENCOUNTER — HOSPITAL ENCOUNTER (OUTPATIENT)
Dept: RADIOLOGY | Facility: HOSPITAL | Age: 44
Discharge: HOME | End: 2024-10-08

## 2024-10-08 VITALS — WEIGHT: 130 LBS | HEIGHT: 66 IN | BODY MASS INDEX: 20.89 KG/M2

## 2024-10-08 DIAGNOSIS — Z12.31 BREAST CANCER SCREENING BY MAMMOGRAM: ICD-10-CM

## 2024-10-08 PROCEDURE — 77067 SCR MAMMO BI INCL CAD: CPT

## 2024-10-08 PROCEDURE — 77063 BREAST TOMOSYNTHESIS BI: CPT | Performed by: RADIOLOGY

## 2024-10-08 PROCEDURE — 77067 SCR MAMMO BI INCL CAD: CPT | Performed by: RADIOLOGY

## 2024-10-18 ENCOUNTER — OFFICE VISIT (OUTPATIENT)
Dept: SURGERY | Age: 44
End: 2024-10-18

## 2024-10-18 VITALS — TEMPERATURE: 98.1 F

## 2024-10-18 DIAGNOSIS — R23.8 FACIAL AGING: Primary | ICD-10-CM

## 2024-10-22 NOTE — PROGRESS NOTES
S: Kacie is here today for Botox injections. Her last injection was 2 weeks ago to her forehead, glabellar region, and lateral brow region.     O: Patient has dynamic glabellar frown lines, forehead wrinkles and crows feet.  Markedly improved.  Patient still has some motion in the lower portion of her glabella that she would like to add additional units.    A: Facial wrinkles     P: A total of 6 units of Botox was injected to the forehead and glabellar area without any complications under sterile techniques.    Physician sample was used.        This document is generated, in part, by voice recognition software and thus  syntax and grammatical errors are possible.    Sadi Gooden MD  12:55 PM  10/22/2024

## 2024-10-28 ENCOUNTER — PROCEDURE VISIT (OUTPATIENT)
Dept: SURGERY | Age: 44
End: 2024-10-28

## 2024-10-28 VITALS — TEMPERATURE: 98.3 F

## 2024-10-28 DIAGNOSIS — I78.1 TELANGIECTASIA: Primary | ICD-10-CM

## 2024-10-28 PROCEDURE — NBSRV NON-BILLABLE SERVICE: Performed by: PHYSICIAN ASSISTANT

## 2024-10-28 PROCEDURE — MISCSC15: Performed by: PHYSICIAN ASSISTANT

## 2024-10-28 NOTE — PROGRESS NOTES
Asclera 20 mg per 2ml  lot#[id number] exp 2025/02  [id number]    Asclera 10mg per 2ml lot# [id number] exp 2024/11  Ndc [id number]

## 2024-10-28 NOTE — PROGRESS NOTES
Sclerotherapy Procedure Note:      The risks, benefits and options were discussed with the pt. The risks included but not limited to pain, bleeding, infection, asymmetry,  and need for further procedures and death. The patient understands that there is a risk for PE or DVT. There may be a need for touch up injections and follow up at 4 weeks is encouraged. All of Her questions were answered to Her satisfaction and She agrees to proceed with the procedure.    The treatment area was cleansed using an alcohol swab.  The bilateral upper thigh bilateral posterior thigh bilateral popliteal fossa and bilateral calf was cleansed with alcohol. Ice was used to numb the area. There was pinpoint bleeding and local redness. The patient tolerated the procedure well.        Treatment Duration : 15    Asclera 0.5%  amount used- 2cc  Asclera 1.0%  amount used- 2cc    Waste  Asclera 0.5%  waste- 0  Asclera 1.0% waste- 0    Areas treated: Bilateral anterior and posterior thigh bilateral popliteal fossa bilateral calf    Postprocedural discharge instructions were reviewed with the patient.  They were reminded to wear the compression stockings for the next 2 weeks.  They were informed to wear the compression stockings 24 hours a day for the first 48 hours. They understand they are to continue ambulation and walking with normal activities of daily living in a normal manner.  They were informed that a 20-minute walk or 20-minute exercise would yield better results.  I informed them to continue their regular exercise program if they had current program.  I did recommend to avoid high impact aerobics or high impact training for 1 week.  They recommended not to expose their legs to the sun for 5 days after treatment and use SPF 15 sunscreen.  They are informed sunburn after treatment will increase the risk of skin damage and possibly ulceration at the site of injection.  They were educated not to shave their legs until the morning after

## 2024-12-04 NOTE — PROGRESS NOTES
Botulinum Toxin Injection Procedure Note:    The patient  would like to continue with Xeomin.        The risks, benefits and options were discussed with the pt. The risks included but not limited to pain, bleeding, infection, asymmetry,  and need for further procedures. The patient understands that there is a risk for upper eyelid ptosis. There may be a need for touch up injections and follow up at 2 weeks is encouraged. All of Her questions were answered to Her satisfaction and She agrees to proceed with the operation.    The glabella was cleansed with alcohol. Ice was used to numb the area. The different FDA approved brands of botulinum toxin A were discussed with the patient, Xeomin was agreed upon and reconstituted in a concentration of 1 unit/ 0.01cc with sterile injectable saline.  92 units were injected into the areas. There was pinpoint bleeding and local redness. The patient tolerated the procedure well.    The patient was counseled to use ice for the next hour and limit strenuous activity for 24 hours.        This document is generated, in part, by voice recognition software and thus  syntax and grammatical errors are possible.    Sadi Gooden MD  9:21 AM  12/19/2024

## 2024-12-13 ENCOUNTER — OFFICE VISIT (OUTPATIENT)
Dept: SURGERY | Age: 44
End: 2024-12-13

## 2024-12-13 VITALS
SYSTOLIC BLOOD PRESSURE: 100 MMHG | DIASTOLIC BLOOD PRESSURE: 64 MMHG | RESPIRATION RATE: 18 BRPM | TEMPERATURE: 98.2 F | HEART RATE: 78 BPM | OXYGEN SATURATION: 99 %

## 2024-12-13 DIAGNOSIS — R23.8 FACIAL AGING: Primary | ICD-10-CM

## 2024-12-13 DIAGNOSIS — I83.93 SPIDER VEINS OF BOTH LOWER EXTREMITIES: ICD-10-CM

## 2024-12-13 DIAGNOSIS — I78.1 TELANGIECTASIA: Primary | ICD-10-CM

## 2024-12-13 PROCEDURE — DM00150 PR DERM ONLY BOTOX INJ LEVEL 6: Performed by: PLASTIC SURGERY

## 2024-12-13 NOTE — PROGRESS NOTES
Subjective:    Follow up today from sclerotherapy with a sclera injections to bilateral legs.  Patient states she had a wonderful result and is very happy with her results today.  She states she was adherent to using her compression stockings as directed she presents today for examination.   Objective:    There were no vitals taken for this visit.      Bilateral anterior posterior thigh as well as popliteal fossa bilaterally and lower legs with drastically improved appearance of telangiectasia and spider veins.  Patient does have a small remaining area of telangiectasia to the right medial thigh.    Assessment:    CBC: No results found for: \"WBC\", \"RBC\", \"HGB\", \"HCT\", \"MCV\", \"MCH\", \"MCHC\", \"RDW\", \"PLT\", \"MPV\"  BMP:  No results found for: \"NA\", \"K\", \"CL\", \"CO2\", \"BUN\", \"LABALBU\", \"CREATININE\", \"CALCIUM\", \"GFRAA\", \"LABGLOM\", \"GLUCOSE\", \"GLU\"  Hepatic Function Panel:  No results found for: \"ALKPHOS\", \"ALT\", \"AST\", \"BILITOT\", \"BILIDIR\", \"IBILI\", \"LABALBU\"   There is no problem list on file for this patient.      Plan:     Spider veins bilateral legs.    As patient states she had a wonderful result from 1 treatment of her a sclera she is unsure if she wants to proceed with any additional treatments that she will continue to monitor the areas.  I informed the patient as she is 8 weeks out from her treatment she may still see improved results as time progresses.  She again voiced appreciation for her care and treatment today    F/U PRN    Call office with concerns or signs of infection.    VIRGINIE Ponce   11:25 AM  12/13/2024

## 2025-02-07 NOTE — PROGRESS NOTES
Botulinum Toxin Injection Procedure Note:    The patient  would like to continue with Xeomin.        The risks, benefits and options were discussed with the pt. The risks included but not limited to pain, bleeding, infection, asymmetry,  and need for further procedures. The patient understands that there is a risk for upper eyelid ptosis. There may be a need for touch up injections and follow up at 2 weeks is encouraged. All of Her questions were answered to Her satisfaction and She agrees to proceed with the operation.    The glabella was cleansed with alcohol. Ice was used to numb the area. The different FDA approved brands of botulinum toxin A were discussed with the patient, Xeomin was agreed upon and reconstituted in a concentration of 1 unit/ 0.01cc with sterile injectable saline.  92 units were injected into the areas. There was pinpoint bleeding and local redness. The patient tolerated the procedure well.    The patient was counseled to use ice for the next hour and limit strenuous activity for 24 hours.          This document is generated, in part, by voice recognition software and thus  syntax and grammatical errors are possible.    Sadi Gooden MD  12:53 PM  2/28/2025

## 2025-02-28 ENCOUNTER — OFFICE VISIT (OUTPATIENT)
Dept: SURGERY | Age: 45
End: 2025-02-28

## 2025-02-28 VITALS — TEMPERATURE: 98 F

## 2025-02-28 DIAGNOSIS — R23.8 FACIAL AGING: Primary | ICD-10-CM

## 2025-05-16 ENCOUNTER — OFFICE VISIT (OUTPATIENT)
Dept: SURGERY | Age: 45
End: 2025-05-16

## 2025-05-16 VITALS — OXYGEN SATURATION: 97 % | HEART RATE: 59 BPM | TEMPERATURE: 98.1 F

## 2025-05-16 DIAGNOSIS — R23.8 FACIAL AGING: Primary | ICD-10-CM

## 2025-05-16 NOTE — PROGRESS NOTES
Xeomin 92 units-cosmetic  Lot: [id number]  Exp: 2027-06  Ndc: 67570-828-01    Bacteriostatic 0.9% Sodium Chloride  Lot: [id number]  Exp: 31 May 2026  Ndc: [id number]

## 2025-05-16 NOTE — PROGRESS NOTES
Botulinum Toxin Injection Procedure Note:    The patient  would like to continue with Xeomin.        The risks, benefits and options were discussed with the pt. The risks included but not limited to pain, bleeding, infection, asymmetry,  and need for further procedures. The patient understands that there is a risk for upper eyelid ptosis. There may be a need for touch up injections and follow up at 2 weeks is encouraged. All of Her questions were answered to Her satisfaction and She agrees to proceed with the operation.    The glabella was cleansed with alcohol. Ice was used to numb the area. The different FDA approved brands of botulinum toxin A were discussed with the patient, Xeomin was agreed upon and reconstituted in a concentration of 1 unit/ 0.01cc with sterile injectable saline.  92 units were injected into the areas. There was pinpoint bleeding and local redness. The patient tolerated the procedure well.    The patient was counseled to use ice for the next hour and limit strenuous activity for 24 hours.          This document is generated, in part, by voice recognition software and thus  syntax and grammatical errors are possible.    Sadi Gooden MD  12:00 PM  5/16/2025

## 2025-08-13 ENCOUNTER — OFFICE VISIT (OUTPATIENT)
Dept: SURGERY | Age: 45
End: 2025-08-13

## 2025-08-13 VITALS — HEART RATE: 64 BPM | TEMPERATURE: 98 F | OXYGEN SATURATION: 99 %

## 2025-08-13 DIAGNOSIS — R23.8 FACIAL AGING: Primary | ICD-10-CM

## 2025-08-13 PROCEDURE — DM00275 XEOMIN 12: Performed by: PLASTIC SURGERY

## 2025-10-07 ENCOUNTER — APPOINTMENT (OUTPATIENT)
Facility: CLINIC | Age: 45
End: 2025-10-07